# Patient Record
Sex: MALE | Race: OTHER | HISPANIC OR LATINO | URBAN - METROPOLITAN AREA
[De-identification: names, ages, dates, MRNs, and addresses within clinical notes are randomized per-mention and may not be internally consistent; named-entity substitution may affect disease eponyms.]

---

## 2023-08-19 ENCOUNTER — EMERGENCY (EMERGENCY)
Facility: HOSPITAL | Age: 58
LOS: 0 days | Discharge: HOME | End: 2023-08-19
Attending: STUDENT IN AN ORGANIZED HEALTH CARE EDUCATION/TRAINING PROGRAM
Payer: MEDICAID

## 2023-08-19 VITALS
RESPIRATION RATE: 18 BRPM | DIASTOLIC BLOOD PRESSURE: 91 MMHG | SYSTOLIC BLOOD PRESSURE: 144 MMHG | TEMPERATURE: 99 F | HEIGHT: 70 IN | WEIGHT: 199.96 LBS | OXYGEN SATURATION: 100 % | HEART RATE: 77 BPM

## 2023-08-19 DIAGNOSIS — K04.7 PERIAPICAL ABSCESS WITHOUT SINUS: ICD-10-CM

## 2023-08-19 DIAGNOSIS — K08.89 OTHER SPECIFIED DISORDERS OF TEETH AND SUPPORTING STRUCTURES: ICD-10-CM

## 2023-08-19 PROCEDURE — 99283 EMERGENCY DEPT VISIT LOW MDM: CPT

## 2023-08-19 RX ORDER — ACETAMINOPHEN 500 MG
650 TABLET ORAL ONCE
Refills: 0 | Status: COMPLETED | OUTPATIENT
Start: 2023-08-19 | End: 2023-08-19

## 2023-08-19 RX ADMIN — Medication 650 MILLIGRAM(S): at 17:15

## 2023-08-19 RX ADMIN — Medication 1 TABLET(S): at 17:14

## 2023-08-19 NOTE — ED STATDOCS - PATIENT PORTAL LINK FT
You can access the FollowMyHealth Patient Portal offered by [facility] by registering at the following website: [email or web address]. By joining [facility]’s FollowMyHealth portal, you will also be able to view your health information using other applications (apps) compatible with our system.

## 2023-08-19 NOTE — ED STATDOCS - OBJECTIVE STATEMENT
56 y/o male presents to the ED c/o dental pain. Pt reports he was chewing something a week ago and cracked his right molar. Pt has an appt with a dentist in 2 days. Pt called and spoke with the dentist office and was advised to come to the ED for abx. No other complaints at this time.

## 2023-08-19 NOTE — ED STATDOCS - CLINICAL SUMMARY MEDICAL DECISION MAKING FREE TEXT BOX
58 y/o male with dental caries and infection. Will give abx, pain control, reeval. 56 y/o male with dental caries and infection. Will give abx, pain control, reeval.    Remigio DO: patient resting comfortably; f/u with private dentist as already scheduled. Strict return precautions given.

## 2023-08-19 NOTE — ED STATDOCS - ENMT, MLM
Nasal mucosa clear.  Mouth with normal mucosa. Tooth number 1 cracked with surrounding swelling.  Throat has no vesicles, no oropharyngeal exudates and uvula is midline.

## 2023-08-19 NOTE — ED ADULT NURSE NOTE - NS_ED_NURSE_TEACHING_TOPIC_ED_A_ED
Pt d/c and educated on all d/c medications at this time of d/c home with return verbal understanding of all d/c instructions at this time.

## 2023-08-19 NOTE — ED ADULT TRIAGE NOTE - CHIEF COMPLAINT QUOTE
Pt presents to ER c/o right sided tooth pain. Pt reports he cracked his tooth one week ago and has not been able to see the dentist yet. Denies fever/chills

## 2023-08-19 NOTE — ED ADULT NURSE NOTE - NSFALLUNIVINTERV_ED_ALL_ED
Bed/Stretcher in lowest position, wheels locked, appropriate side rails in place/Call bell, personal items and telephone in reach/Instruct patient to call for assistance before getting out of bed/chair/stretcher/Non-slip footwear applied when patient is off stretcher/Falls to call system/Physically safe environment - no spills, clutter or unnecessary equipment/Purposeful proactive rounding/Room/bathroom lighting operational, light cord in reach